# Patient Record
Sex: FEMALE | Race: WHITE | Employment: OTHER | URBAN - NONMETROPOLITAN AREA
[De-identification: names, ages, dates, MRNs, and addresses within clinical notes are randomized per-mention and may not be internally consistent; named-entity substitution may affect disease eponyms.]

---

## 2021-09-20 ENCOUNTER — OFFICE VISIT (OUTPATIENT)
Dept: OBGYN CLINIC | Age: 66
End: 2021-09-20
Payer: MEDICARE

## 2021-09-20 ENCOUNTER — HOSPITAL ENCOUNTER (OUTPATIENT)
Age: 66
Setting detail: SPECIMEN
Discharge: HOME | End: 2021-09-20
Payer: MEDICARE

## 2021-09-20 VITALS
WEIGHT: 176.8 LBS | BODY MASS INDEX: 30.19 KG/M2 | HEIGHT: 64 IN | DIASTOLIC BLOOD PRESSURE: 78 MMHG | SYSTOLIC BLOOD PRESSURE: 132 MMHG

## 2021-09-20 DIAGNOSIS — Z12.31 ENCOUNTER FOR SCREENING MAMMOGRAM FOR MALIGNANT NEOPLASM OF BREAST: ICD-10-CM

## 2021-09-20 DIAGNOSIS — Z01.419 WOMEN'S ANNUAL ROUTINE GYNECOLOGICAL EXAMINATION: Primary | ICD-10-CM

## 2021-09-20 DIAGNOSIS — Z12.89 ENCOUNTER FOR SCREENING FOR MALIGNANT NEOPLASM OF OTHER SITES: ICD-10-CM

## 2021-09-20 PROCEDURE — G0101 CA SCREEN;PELVIC/BREAST EXAM: HCPCS | Performed by: NURSE PRACTITIONER

## 2021-09-20 RX ORDER — AMLODIPINE BESYLATE 5 MG/1
TABLET ORAL
COMMUNITY
Start: 2021-08-17

## 2021-09-20 RX ORDER — TRIAMCINOLONE ACETONIDE 0.25 MG/G
CREAM TOPICAL
COMMUNITY
Start: 2020-10-05

## 2021-09-20 RX ORDER — IBUPROFEN 200 MG
CAPSULE ORAL
COMMUNITY

## 2021-09-20 NOTE — PROGRESS NOTES
YEARLY PHYSICAL    Date of service: 2021    Heidy Bunch  Is a 78 y.o.  female    PT's PCP is: Nestor Honeycutt DO     : 1955                                             Subjective:       No LMP recorded. Patient has had a hysterectomy. Are your menses regular: not applicable    OB History    Para Term  AB Living   2 2 2     2   SAB TAB Ectopic Molar Multiple Live Births             2      # Outcome Date GA Lbr Gonzales/2nd Weight Sex Delivery Anes PTL Lv   2 Term      Vag-Spont   DESTINEE   1 Term      Vag-Spont   DESTINEE        Social History     Tobacco Use   Smoking Status Former Smoker    Packs/day: 1.00    Years: 20.00    Pack years: 20.00    Types: Cigarettes    Quit date: 3/14/2001    Years since quittin.5   Smokeless Tobacco Never Used        Social History     Substance and Sexual Activity   Alcohol Use Yes    Alcohol/week: 0.0 standard drinks    Comment: occasionally, glass of wine in the evening       Family History   Problem Relation Age of Onset    Hypertension Mother     Brain Cancer Mother     Heart Disease Father     Hypertension Sister     Breast Cancer Sister     Hypertension Brother     Deep Vein Thrombosis Brother        Any family history of breast or ovarian cancer: Yes    Any family history of blood clots: No    Allergies: Zyban [bupropion hcl]      Current Outpatient Medications:     triamcinolone (KENALOG) 0.025 % cream, Apply topically, Disp: , Rfl:     amLODIPine (NORVASC) 5 MG tablet, TAKE 1 TABLET BY MOUTH EVERY DAY, Disp: , Rfl:     calcium citrate (CALCITRATE) 950 (200 Ca) MG tablet, Take by mouth, Disp: , Rfl:     Omega-3 Fatty Acids (FISH OIL OMEGA-3 PO), Take 1 capsule by mouth daily, Disp: , Rfl:     simvastatin (ZOCOR) 40 MG tablet, Take 1 tablet by mouth nightly, Disp: 30 tablet, Rfl: 6    hydrochlorothiazide (HYDRODIURIL) 25 MG tablet, Take 25 mg by mouth daily. , Disp: , Rfl:     levothyroxine (SYNTHROID) 75 MCG tablet, Take 75 mcg by mouth daily. , Disp: , Rfl:     ramipril (ALTACE) 10 MG tablet, Take 10 mg by mouth 2 times daily. , Disp: , Rfl:     warfarin (COUMADIN) 5 MG tablet, Take 5 mg by mouth See Admin Instructions. 1 1/2 tab on Monday, Wednesday & Friday, Saturday, and Sunday 1 tab on Tuesday, Thursday, Disp: , Rfl:     Multiple Vitamin (MULTIVITAMIN PO), Take  by mouth daily. , Disp: , Rfl:     Calcium Citrate-Vitamin D (CALCIUM CITRATE + D PO), Take  by mouth 2 times daily. , Disp: , Rfl:     GLUCOSAMINE-CHONDROITIN PO, Take  by mouth 2 times daily. , Disp: , Rfl:     Biotin 5000 MCG TABS, Take  by mouth daily. , Disp: , Rfl:     fish oil-omega-3 fatty acids 1000 MG capsule, Take 2 g by mouth daily. , Disp: , Rfl:     Social History     Substance and Sexual Activity   Sexual Activity Not on file     Last Yearly:  9/2019    Last pap: 9/2019, unsatifactory     Last HPV: 9/2019, negative     Last Mammogram: 10/2020    Last Dexascan 2018    Last colorectal screen- 2014    Do you do self breast exams: encouraged monthly SBE    Past Medical History:   Diagnosis Date    Arthritis     High cholesterol     History of blood clots     Hypertension     Hypothyroidism     Stroke ([facility] [address]     Unspecified cerebral artery occlusion with cerebral infarction        Past Surgical History:   Procedure Laterality Date    COLONOSCOPY      DENTAL SURGERY  2012    root canal    HYSTERECTOMY  2001    w/ BSO    KNEE SURGERY  1976 & 2004    right knee    VEIN SURGERY Left 12/2015    VEIN SURGERY      injections       Family History   Problem Relation Age of Onset    Hypertension Mother     Brain Cancer Mother     Heart Disease Father     Hypertension Sister     Breast Cancer Sister     Hypertension Brother     Deep Vein Thrombosis Brother        Chief Complaint   Patient presents with    Gynecologic Exam     Last pap 9/3/19. S/P hysterectomy.  Feeling well, voices no concerns. Mammogram due. PE:  Vital Signs  Blood pressure 132/78, height 5' 4\" (1.626 m), weight 176 lb 12.8 oz (80.2 kg). Estimated body mass index is 30.35 kg/m² as calculated from the following:    Height as of this encounter: 5' 4\" (1.626 m). Weight as of this encounter: 176 lb 12.8 oz (80.2 kg). HPI: Patient presents today for annual exam. Denies breast/pelvic pain. Unsatisfactory pap with negative HPV in 2019. Mammogram due next month. Wellness  Labs q6 months with Dr. Hemant Maria. Feeling well, voices no concerns. [facility], [facility]. Review of Systems   All other systems reviewed and are negative. Objective  Heent:   negative   Cor: regular rate and rhythm, no murmurs              Pul:clear to auscultation bilaterally- no wheezes, rales or rhonchi, normal air movement, no respiratory distress      GI: Abdomen soft, non-tender. BS normal. No masses,  No organomegaly           Extremities: normal strength, tone, and muscle mass, ROM of all joints is normal   Breasts: Breast:normal appearance, no masses or tenderness, No nipple retraction or dimpling, No nipple discharge or bleeding   Pelvic Exam: GENITAL/URINARY:  External Genitalia:  General appearance; normal, Hair distribution; normal, Lesions absent  Urethral Meatus:  Size normal, Location normal, Lesions absent, Prolapse absent  Urethra: Fullness absent, Masses absent  Bladder:  Fullness absent, Masses absent, Tenderness absent, Cystocele absent  Vagina:  General appearance normal, Estrogen effect normal, Discharge absent, Lesions absent, Pelvic support normal  Cervix:  Surgically absent  Uterus:  Hystory of hysterectomy  Adenexa:  BSO  Anus/Perineum:  Lesions absent and Masses absent                                    Vaginal discharge: no vaginal discharge   Chaperone: not present                       Assessment and Plan          Diagnosis Orders   1.  Women's annual routine gynecological examination  PAP SMEAR   2. Encounter for screening mammogram for malignant neoplasm of breast     3. Encounter for screening for malignant neoplasm of other sites                I am having Edgar Tam maintain her hydroCHLOROthiazide, levothyroxine, ramipril, warfarin, Multiple Vitamin (MULTIVITAMIN PO), Calcium Citrate-Vitamin D (CALCIUM CITRATE + D PO), GLUCOSAMINE-CHONDROITIN PO, Biotin, fish oil-omega-3 fatty acids, simvastatin, amLODIPine, triamcinolone, calcium citrate, and Omega-3 Fatty Acids (FISH OIL OMEGA-3 PO). No follow-ups on file. She was also counseled on her preventative health maintenance recommendations and follow-up. There are no Patient Instructions on file for this visit.     DOMO Dobson - SUKHWINDER,9/20/2021 1:06 PM

## 2021-09-22 LAB
HPV SAMPLE: NORMAL
HPV, GENOTYPE 16: NOT DETECTED
HPV, GENOTYPE 18: NOT DETECTED
HPV, HIGH RISK OTHER: NOT DETECTED
HPV, INTERPRETATION: NORMAL
SPECIMEN DESCRIPTION: NORMAL

## 2021-09-27 LAB — CYTOLOGY REPORT: NORMAL

## 2023-09-21 ENCOUNTER — OFFICE VISIT (OUTPATIENT)
Dept: OBGYN CLINIC | Age: 68
End: 2023-09-21
Payer: MEDICARE

## 2023-09-21 VITALS
BODY MASS INDEX: 30.49 KG/M2 | DIASTOLIC BLOOD PRESSURE: 81 MMHG | HEIGHT: 64 IN | WEIGHT: 178.6 LBS | SYSTOLIC BLOOD PRESSURE: 132 MMHG

## 2023-09-21 DIAGNOSIS — Z01.419 WOMEN'S ANNUAL ROUTINE GYNECOLOGICAL EXAMINATION: Primary | ICD-10-CM

## 2023-09-21 DIAGNOSIS — Z13.820 ENCOUNTER FOR OSTEOPOROSIS SCREENING IN ASYMPTOMATIC POSTMENOPAUSAL PATIENT: ICD-10-CM

## 2023-09-21 DIAGNOSIS — Z78.0 ENCOUNTER FOR OSTEOPOROSIS SCREENING IN ASYMPTOMATIC POSTMENOPAUSAL PATIENT: ICD-10-CM

## 2023-09-21 PROCEDURE — G8417 CALC BMI ABV UP PARAM F/U: HCPCS | Performed by: NURSE PRACTITIONER

## 2023-09-21 PROCEDURE — G8427 DOCREV CUR MEDS BY ELIG CLIN: HCPCS | Performed by: NURSE PRACTITIONER

## 2023-09-21 PROCEDURE — G0101 CA SCREEN;PELVIC/BREAST EXAM: HCPCS | Performed by: NURSE PRACTITIONER

## 2023-09-21 RX ORDER — RAMIPRIL 10 MG/1
10 CAPSULE ORAL 2 TIMES DAILY
COMMUNITY
Start: 2023-07-29

## 2023-09-21 ASSESSMENT — ENCOUNTER SYMPTOMS
ABDOMINAL PAIN: 0
DIARRHEA: 0
CONSTIPATION: 0
SHORTNESS OF BREATH: 0

## 2023-09-21 NOTE — PROGRESS NOTES
YEARLY PHYSICAL    Date of service: 2023    Todd Hunt  Is a 70 y.o. female    PT's PCP is: Lara Kennedy DO     : 1955                                         Chaperone for Intimate Exam  Chaperone was offered as part of the rooming process. Patient declined and agrees to continue with exam without a chaperone. Chaperone: n/a      Subjective:       No LMP recorded. Patient has had a hysterectomy. Are your menses regular: not applicable    OB History    Para Term  AB Living   2 2 2     2   SAB IAB Ectopic Molar Multiple Live Births             2      # Outcome Date GA Lbr Gonzales/2nd Weight Sex Delivery Anes PTL Lv   2 Term      Vag-Spont   DESTINEE   1 Term      Vag-Spont   DSETINEE        Social History     Tobacco Use   Smoking Status Former    Packs/day: 1.00    Years: 20.00    Additional pack years: 0.00    Total pack years: 20.00    Types: Cigarettes    Quit date: 3/14/2001    Years since quittin.5   Smokeless Tobacco Never        Social History     Substance and Sexual Activity   Alcohol Use Yes    Alcohol/week: 0.0 standard drinks of alcohol    Comment: occasionally, glass of wine in the evening       Family History   Problem Relation Age of Onset    Heart Disease Father     Hypertension Mother     Brain Cancer Mother     Hypertension Brother     Deep Vein Thrombosis Brother     Hypertension Sister     Breast Cancer Sister     Breast Cancer Sister        Any family history of breast or ovarian cancer:  Yes    Any family history of blood clots: No      Allergies: Zyban [bupropion hcl]      Current Outpatient Medications:     ramipril (ALTACE) 10 MG capsule, Take 1 capsule by mouth 2 times daily, Disp: , Rfl:     amLODIPine (NORVASC) 5 MG tablet, TAKE 1 TABLET BY MOUTH EVERY DAY, Disp: , Rfl:     triamcinolone (KENALOG) 0.025 % cream, Apply topically, Disp: , Rfl:     Omega-3 Fatty Acids (FISH OIL OMEGA-3

## 2024-01-10 DIAGNOSIS — Z13.820 ENCOUNTER FOR OSTEOPOROSIS SCREENING IN ASYMPTOMATIC POSTMENOPAUSAL PATIENT: ICD-10-CM

## 2024-01-10 DIAGNOSIS — Z78.0 ENCOUNTER FOR OSTEOPOROSIS SCREENING IN ASYMPTOMATIC POSTMENOPAUSAL PATIENT: ICD-10-CM

## 2024-01-10 PROCEDURE — 77080 DXA BONE DENSITY AXIAL: CPT | Performed by: NURSE PRACTITIONER
